# Patient Record
Sex: FEMALE | Race: WHITE | NOT HISPANIC OR LATINO | Employment: FULL TIME | URBAN - METROPOLITAN AREA
[De-identification: names, ages, dates, MRNs, and addresses within clinical notes are randomized per-mention and may not be internally consistent; named-entity substitution may affect disease eponyms.]

---

## 2017-11-08 ENCOUNTER — OFFICE VISIT (OUTPATIENT)
Dept: OBSTETRICS AND GYNECOLOGY | Facility: CLINIC | Age: 27
End: 2017-11-08
Payer: COMMERCIAL

## 2017-11-08 VITALS
SYSTOLIC BLOOD PRESSURE: 128 MMHG | WEIGHT: 116.06 LBS | DIASTOLIC BLOOD PRESSURE: 80 MMHG | HEIGHT: 63 IN | BODY MASS INDEX: 20.56 KG/M2

## 2017-11-08 DIAGNOSIS — Z30.9 ENCOUNTER FOR CONTRACEPTIVE MANAGEMENT, UNSPECIFIED TYPE: ICD-10-CM

## 2017-11-08 DIAGNOSIS — Z12.4 CERVICAL CANCER SCREENING: ICD-10-CM

## 2017-11-08 DIAGNOSIS — Z01.419 WELL WOMAN EXAM: Primary | ICD-10-CM

## 2017-11-08 DIAGNOSIS — N89.8 VAGINAL MASS: ICD-10-CM

## 2017-11-08 DIAGNOSIS — R30.0 DYSURIA: ICD-10-CM

## 2017-11-08 LAB
B-HCG UR QL: NEGATIVE
BILIRUB SERPL-MCNC: ABNORMAL MG/DL
BLOOD URINE, POC: ABNORMAL
CANDIDA RRNA VAG QL PROBE: NEGATIVE
COLOR, POC UA: ABNORMAL
CTP QC/QA: YES
G VAGINALIS RRNA GENITAL QL PROBE: POSITIVE
GLUCOSE UR QL STRIP: ABNORMAL
KETONES UR QL STRIP: ABNORMAL
LEUKOCYTE ESTERASE URINE, POC: ABNORMAL
NITRITE, POC UA: ABNORMAL
PH, POC UA: 8
PROTEIN, POC: ABNORMAL
SPECIFIC GRAVITY, POC UA: 1.01
T VAGINALIS RRNA GENITAL QL PROBE: NEGATIVE
UROBILINOGEN, POC UA: ABNORMAL

## 2017-11-08 PROCEDURE — 87591 N.GONORRHOEAE DNA AMP PROB: CPT

## 2017-11-08 PROCEDURE — 87660 TRICHOMONAS VAGIN DIR PROBE: CPT

## 2017-11-08 PROCEDURE — 87186 SC STD MICRODIL/AGAR DIL: CPT

## 2017-11-08 PROCEDURE — 87480 CANDIDA DNA DIR PROBE: CPT

## 2017-11-08 PROCEDURE — 87077 CULTURE AEROBIC IDENTIFY: CPT

## 2017-11-08 PROCEDURE — 81025 URINE PREGNANCY TEST: CPT | Mod: S$GLB,,, | Performed by: STUDENT IN AN ORGANIZED HEALTH CARE EDUCATION/TRAINING PROGRAM

## 2017-11-08 PROCEDURE — 87086 URINE CULTURE/COLONY COUNT: CPT

## 2017-11-08 PROCEDURE — 88175 CYTOPATH C/V AUTO FLUID REDO: CPT

## 2017-11-08 PROCEDURE — 99999 PR PBB SHADOW E&M-NEW PATIENT-LVL III: CPT | Mod: PBBFAC,,, | Performed by: STUDENT IN AN ORGANIZED HEALTH CARE EDUCATION/TRAINING PROGRAM

## 2017-11-08 PROCEDURE — 99385 PREV VISIT NEW AGE 18-39: CPT | Mod: 25,S$GLB,, | Performed by: STUDENT IN AN ORGANIZED HEALTH CARE EDUCATION/TRAINING PROGRAM

## 2017-11-08 PROCEDURE — 87088 URINE BACTERIA CULTURE: CPT

## 2017-11-08 PROCEDURE — 81002 URINALYSIS NONAUTO W/O SCOPE: CPT | Mod: S$GLB,,, | Performed by: STUDENT IN AN ORGANIZED HEALTH CARE EDUCATION/TRAINING PROGRAM

## 2017-11-08 RX ORDER — AMOXICILLIN 500 MG
2 CAPSULE ORAL DAILY
COMMUNITY

## 2017-11-08 RX ORDER — NICOTINE POLACRILEX 2 MG
1 GUM BUCCAL DAILY
COMMUNITY

## 2017-11-08 NOTE — PROGRESS NOTES
Chief Complaint: Well Woman Exam     HPI:      Elvira Leigh is a 27 y.o.  who presents today for well woman exam.  LMP: Patient's last menstrual period was 10/11/2017 (exact date).  Patient reports a vaginal cyst/mass that she has noticed for the past week.  She also reports some dysuria and vaginal spotting since this time.  She reports that her urine is also cloudy.  She denies any other issues, problems, or complaints. Specifically, patient denies abnormal vaginal bleeding, discharge, pelvic pain, urinary problems, or changes in appetite. Ms. Leigh is currently sexually active with a single male partner. She is currently using condoms for contraception. She would like STD screening today.    Previous Pap: No result found    GYN Hx:  13/R/6-7 days.  Denies any abnormal paps or STIs.  Gardasil:  No.   OB History      Para Term  AB Living    1       1      SAB TAB Ectopic Multiple Live Births      0              Past Medical History:   Diagnosis Date    Pap smear for cervical cancer screening 2016    Negative ([facility])     Past Surgical History:   Procedure Laterality Date    DILATION AND CURETTAGE OF UTERUS      Eab    NO PAST SURGERIES      As of 17     Social History     Social History    Marital status: Single     Spouse name: N/A    Number of children: N/A    Years of education: N/A     Occupational History    Not on file.     Social History Main Topics    Smoking status: Never Smoker    Smokeless tobacco: Current User    Alcohol use Yes      Comment: Social     Drug use: No    Sexual activity: Yes     Partners: Male     Birth control/ protection: Condom, Coitus interruptus      Comment: Single:      Other Topics Concern    Not on file     Social History Narrative    No narrative on file     Family History   Problem Relation Age of Onset    Colon cancer Maternal Grandfather     Cancer Maternal Grandfather     Breast cancer Neg Hx     Ovarian  "cancer Neg Hx        ROS:     GENERAL: Denies unintentional weight gain or weight loss. Feeling well overall.   SKIN: Denies rash or lesions.   HEENT: Denies headaches, or vision changes.   BREASTS: Denies pain, lumps, or nipple discharge.   ABDOMEN: Denies abdominal pain, constipation, diarrhea, nausea, vomiting, change in appetite.  URINARY: Denies frequency, hematuria.  Reports dysuria.  NEUROLOGIC: Denies syncope or weakness.   PSYCHIATRIC: Denies depression, anxiety or mood swings.    Physical Exam:      PHYSICAL EXAM:  /80   Ht 5' 3" (1.6 m)   Wt 52.7 kg (116 lb 1.2 oz)   LMP 10/11/2017 (Exact Date)   BMI 20.56 kg/m²   Body mass index is 20.56 kg/m².     APPEARANCE: Well nourished, well developed, in no acute distress.  PSYCH: Appropriate mood and affect.  SKIN: No acne or hirsutism.  NECK: Neck symmetric without masses or thyromegaly.  NODES: No inguinal, axillary, or supraclavicular lymph node enlargement.  CHEST: Normal respiratory effort.    CARDIOVASCULAR:  Regular rate and rhythm.  LUNGS:  Clear to auscultation bilaterally.  BREASTS: Symmetrical, no skin changes or visible lesions.  No palpable masses or nipple discharge bilaterally.  ABDOMEN: Soft.  No tenderness or masses.    PELVIC: Normal external genitalia without lesions.  Normal hair distribution.  Adequate perineal body, normal urethral meatus.  Vagina moist and well rugated without lesions or discharge.  Redundant vaginal tissue and bulge appreciated anteriorly 12 to 1 o clock near the urethra.  No discharge from urethra.  Cervix pink, without lesions, discharge or tenderness.  No significant cystocele or rectocele.  Bimanual exam shows uterus to be normal size, regular, mobile and nontender.  Adnexa without masses or tenderness.    EXTREMITIES: No edema.  No tenderness to palpation.    Labs:  UPT Negative, Urine dip trace blood    Assessment/Plan:     27 y.o. yo  here for well woman exam with dysuria    Well woman exam  -     " C. trachomatis/N. gonorrhoeae by AMP DNA Cervicovaginal  -     Vaginosis Screen by DNA Probe  -     Urine culture    Encounter for contraceptive management, unspecified type    Dysuria  -     Urine culture  -     Ambulatory Referral to Urogynecology    Cervical cancer screening  -     Liquid-based pap smear, screening  -     HPV High Risk Genotypes, PCR    Vaginal mass  -     Ambulatory Referral to Urogynecology          Counselin.  Annual exam performed today without difficulty.  Patient was counseled today on current ASCCP pap guidelines, the recommendation for yearly pelvic exams, healthy diet and exercise routines, breast self awareness.  Pap smear collected.  All questions answered.    2.  Contraception:  The risks of, benefits of, and alternatives of various forms of contraception were discussed at this visit. After a discussion of the R/B/A of fertility awareness, barrier contraception, hormonal pills, injections, patches, rings, hormonal and non-hormonal IUDs, and the subdermal implant, all of  questions were answered, and she has opted for oral contraceptive pills.  3.  Dysuria:  Area of redundant tissue versus cyst appreciated near urethral meatus.  Urinalysis and urine culture sent today.  Will refer to Urogynecology for further evaluation.  Appreciate recommendations.  4.  STD testing:  GC/CT collected.  5.  Follow up with PCP for other health maintenance.  6.  RTC in 3 months to evaluate OCPs or sooner if needed.    Use of the [facility] Patient Portal discussed and encouraged during today's visit.

## 2017-11-08 NOTE — LETTER
November 8, 2017                 [facility]  Obstetrics and Gynecology  [address] [place] [address] [address]  [place] [id number]  Phone: [phone]  Fax: [phone]   November 8, 2017     Patient: Elvira Leigh   YOB: 1990   Date of Visit: 11/8/2017       To Whom it May Concern:    Elvira eLigh was seen in my clinic on 11/8/2017. Please excuse her from missing work due to this appointment.   If you have any questions or concerns, please don't hesitate to call.    Sincerely,         Lauren Alcantar MD

## 2017-11-09 ENCOUNTER — TELEPHONE (OUTPATIENT)
Dept: OBSTETRICS AND GYNECOLOGY | Facility: CLINIC | Age: 27
End: 2017-11-09

## 2017-11-09 LAB
C TRACH DNA SPEC QL NAA+PROBE: DETECTED
N GONORRHOEA DNA SPEC QL NAA+PROBE: NOT DETECTED

## 2017-11-09 RX ORDER — METRONIDAZOLE 500 MG/1
500 TABLET ORAL 2 TIMES DAILY
Qty: 14 TABLET | Refills: 0 | Status: SHIPPED | OUTPATIENT
Start: 2017-11-09 | End: 2017-11-16

## 2017-11-09 RX ORDER — AZITHROMYCIN 500 MG/1
1000 TABLET, FILM COATED ORAL ONCE
Qty: 2 TABLET | Refills: 0 | Status: SHIPPED | OUTPATIENT
Start: 2017-11-09 | End: 2017-11-09

## 2017-11-09 RX ORDER — ONDANSETRON 4 MG/1
4 TABLET, FILM COATED ORAL DAILY PRN
Qty: 30 TABLET | Refills: 1 | Status: SHIPPED | OUTPATIENT
Start: 2017-11-09 | End: 2018-11-09

## 2017-11-09 NOTE — TELEPHONE ENCOUNTER
Phone call made to patient to discuss vaginal swab results.  Treatment for chlamydia and bacterial vaginosis sent to pharmacy.  Offered partner therapy and patient will call back with partner information.  All questions answered.

## 2017-11-11 LAB — BACTERIA UR CULT: NORMAL

## 2017-11-13 ENCOUNTER — TELEPHONE (OUTPATIENT)
Dept: OBSTETRICS AND GYNECOLOGY | Facility: CLINIC | Age: 27
End: 2017-11-13

## 2017-11-17 ENCOUNTER — TELEPHONE (OUTPATIENT)
Dept: OBSTETRICS AND GYNECOLOGY | Facility: CLINIC | Age: 27
End: 2017-11-17

## 2017-11-17 NOTE — TELEPHONE ENCOUNTER
Phone call again made to patient to discuss urine culture results. Voicemail left to return MD phone call.

## 2017-11-24 ENCOUNTER — TELEPHONE (OUTPATIENT)
Dept: OBSTETRICS AND GYNECOLOGY | Facility: CLINIC | Age: 27
End: 2017-11-24

## 2017-11-24 RX ORDER — NITROFURANTOIN 25; 75 MG/1; MG/1
100 CAPSULE ORAL 2 TIMES DAILY
Qty: 14 CAPSULE | Refills: 0 | Status: SHIPPED | OUTPATIENT
Start: 2017-11-24 | End: 2017-12-01

## 2017-11-24 NOTE — TELEPHONE ENCOUNTER
Patient returned MD phone call.  Urine culture results discussed with patient.  All questions answered.  Macrobid to pharmacy.  Patient will call if any issues or concerns.